# Patient Record
Sex: MALE | Race: WHITE | URBAN - METROPOLITAN AREA
[De-identification: names, ages, dates, MRNs, and addresses within clinical notes are randomized per-mention and may not be internally consistent; named-entity substitution may affect disease eponyms.]

---

## 2017-01-18 ENCOUNTER — OFFICE VISIT (OUTPATIENT)
Dept: FAMILY MEDICINE | Facility: CLINIC | Age: 62
End: 2017-01-18

## 2017-01-18 VITALS
BODY MASS INDEX: 31.15 KG/M2 | DIASTOLIC BLOOD PRESSURE: 80 MMHG | HEART RATE: 99 BPM | HEIGHT: 72 IN | WEIGHT: 230 LBS | OXYGEN SATURATION: 99 % | SYSTOLIC BLOOD PRESSURE: 124 MMHG | TEMPERATURE: 98.1 F

## 2017-01-18 DIAGNOSIS — Z71.3 WEIGHT LOSS COUNSELING, ENCOUNTER FOR: Primary | ICD-10-CM

## 2017-01-18 PROCEDURE — 99213 OFFICE O/P EST LOW 20 MIN: CPT | Performed by: FAMILY MEDICINE

## 2017-01-18 NOTE — NURSING NOTE
Araceli Vazquez is here discuss starting the medication, Contrave, for weight loss.    Pre-Visit Screening :  Immunizations : up to date  Colon Screening : is up to date  Asthma Action Test/Plan : NA  PHQ9/GAD7 :  Completed Today  BP done on the right arm, with a large sized cuff.  Pulse - regular  My Chart - declines    CLASSIFICATION OF OVERWEIGHT AND OBESITY BY BMI                         Obesity Class           BMI(kg/m2)  Underweight                                    < 18.5  Normal                                         18.5-24.9  Overweight                                     25.0-29.9  OBESITY                     I                  30.0-34.9                              II                 35.0-39.9  EXTREME OBESITY             III                >40                             Patient's  BMI Body mass index is 31.19 kg/(m^2).  http://hin.nhlbi.nih.gov/menuplanner/menu.cgi  Questioned patient about current smoking habits.  Pt. quit smoking some time ago.  Christel Hammond, CMA

## 2017-01-18 NOTE — Clinical Note
Can you tell me what you know about weight loss supplement Contrare-if you want you can tell me in am  thx

## 2017-01-18 NOTE — PROGRESS NOTES
SUBJECTIVE: Araceli Vazquez is a 61 year old male who presents for weight loss discussion.  He has lost weight through [facility] but now gaining back- wants to try medication he saw advertised Contrare.    Patient Active Problem List   Diagnosis     ACP (advance care planning)     Health Care Home     Ulcerative colitis (H)     Obesity     Cervicalgia     Chronic midline low back pain without sciatica     Past Medical History   Diagnosis Date     Ulcerative colitis, unspecified 10/8/2012     Family History   Problem Relation Age of Onset     Asthma No family hx of      C.A.D. 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[employer]     Social History Main Topics     Smoking status: Former Smoker     Smokeless tobacco: Never Used     Alcohol Use: No     Drug Use: No     Sexual Activity:     Partners: Female     Other Topics Concern     Exercise No     Seat Belt Yes     Self-Exams No     Social History Narrative     Past Surgical History   Procedure Laterality Date     No history of surgery         Current Outpatient Prescriptions on File Prior to Visit:  traMADol (ULTRAM) 50 MG tablet TAKE 1 TABLET BY MOUTH EVERY 8 HOURS AS NEEDED   cyclobenzaprine (FLEXERIL) 10 MG tablet Take 1 tablet (10 mg) by mouth nightly as needed     No current facility-administered medications on file prior to visit.     Allergies: Review of patient's allergies indicates no known allergies.    Immunization History   Administered Date(s) Administered     Influenza (IIV3) 10/13/2012, 10/20/2013     Influenza Vaccine IM 3yrs+ 4 Valent  IIV4 10/13/2016     Tdap (Adacel,Boostrix) 08/14/2008        OBJECTIVE:   /80 mmHg  Pulse 99  Temp(Src) 98.1  F (36.7  C) (Oral)  Ht 1.829 m (6')  Wt 104.327 kg (230 lb)  BMI 31.19 kg/m2  SpO2 99%     ASSESSMENT: /PLAN:   (Z71.3) Weight loss counseling, encounter for  (primary encounter diagnosis)  Comment: we discussed my feeling on weight loss medications as short term solution-still need to find long term way to maintain weight loss- I also am unfamiliar with this medication which appears to include naltrexone and bupropion.    Plan: we agree I will discuss with MTM- if I am not comfotable prescribing I will refer to weight loss doc, will call pt tomorrow    Encourage low carb, low sugar and dairy, higher fat and protein diet to help with satiety and normalize insulin levels and BG

## 2017-01-19 NOTE — PROGRESS NOTES
D/w MTM-will OK trial Contrave-reviewed side effects with pt, he has never had a seizure, I reviewed the risks, benefits, and possible side effects of the medication.  The patient had an opportunity to ask any questions regarding the treatment plan. The patient was encouraged to call my office if any problems.     Recheck 12 weeks

## 2017-02-27 DIAGNOSIS — M54.50 CHRONIC MIDLINE LOW BACK PAIN WITHOUT SCIATICA: ICD-10-CM

## 2017-02-27 DIAGNOSIS — G89.29 CHRONIC MIDLINE LOW BACK PAIN WITHOUT SCIATICA: ICD-10-CM

## 2017-02-27 NOTE — TELEPHONE ENCOUNTER
Was last given a refill from [facility] on 7/12/16.     Chronic midline low back pain without sciatica (primary encounter diagnosis)  Comment: normal exam today, we discussed that meds are band aid and that better plan is ongoing stretching and strengthening  Plan: traMADol (ULTRAM) 50 MG tablet, cyclobenzaprine  (FLEXERIL) 10 MG tablet  OK for refill of #90 pills, no more than every 6 mo, handout with core exercises provided, would like him to do there daily    Please advise  Araceli Vazquez is requesting a refill of:    Pending Prescriptions:                       Disp   Refills    traMADol (ULTRAM) 50 MG tablet [Pharmacy *10 tab*0            Sig: TAKE 1 TABLET BY MOUTH EVERY 8 HOURS AS NEEDED    cyclobenzaprine (FLEXERIL) 10 MG tablet [*10 tab*0            Sig: TAKE 1 TABLET(10 MG) BY MOUTH EVERY EVENING AS           NEEDED

## 2017-02-28 NOTE — TELEPHONE ENCOUNTER
Pt will wait for your decision.    Araceli Roca Marcelloorlando is requesting a refill of:    Pending Prescriptions:                       Disp   Refills    traMADol (ULTRAM) 50 MG tablet [Pharmacy *90 tab*0            Sig: TAKE 1 TABLET BY MOUTH EVERY 8 HOURS AS NEEDED    cyclobenzaprine (FLEXERIL) 10 MG tablet [*90 tab*0            Sig: TAKE 1 TABLET(10 MG) BY MOUTH EVERY EVENING AS           NEEDED

## 2017-02-28 NOTE — TELEPHONE ENCOUNTER
Call patient and notify him that DR Walker is out of town. These are prn medications and he will need to wait for a decision next week. It appears in 1/2017 he was started on a new medication and is due for a recheck mid March. Encourage him to schedule his appointment

## 2017-02-28 NOTE — TELEPHONE ENCOUNTER
Forwarded to [facility]- for chronic pain management and refills

## 2017-03-06 RX ORDER — TRAMADOL HYDROCHLORIDE 50 MG/1
TABLET ORAL
Qty: 90 TABLET | Refills: 0 | Status: SHIPPED | OUTPATIENT
Start: 2017-03-06 | End: 2017-10-04

## 2017-03-06 RX ORDER — CYCLOBENZAPRINE HCL 10 MG
TABLET ORAL
Qty: 90 TABLET | Refills: 0 | Status: SHIPPED | OUTPATIENT
Start: 2017-03-06 | End: 2018-01-16

## 2017-03-07 NOTE — TELEPHONE ENCOUNTER
Pt went to  his medication and there was no refill for the cyclobenzaprine.   Called the [facility] and gave a verbal for the medication.   SUSANNE Lal ([facility])

## 2017-03-07 NOTE — TELEPHONE ENCOUNTER
Called patient and informed him that his med request was approved and both prescriptions have been sent to his pharmacy.    Christel Hammond, CMA

## 2017-10-04 DIAGNOSIS — M54.50 CHRONIC MIDLINE LOW BACK PAIN WITHOUT SCIATICA: ICD-10-CM

## 2017-10-04 DIAGNOSIS — G89.29 CHRONIC MIDLINE LOW BACK PAIN WITHOUT SCIATICA: ICD-10-CM

## 2017-10-04 RX ORDER — TRAMADOL HYDROCHLORIDE 50 MG/1
TABLET ORAL
Qty: 30 TABLET | Refills: 0 | OUTPATIENT
Start: 2017-10-04

## 2017-10-04 RX ORDER — TRAMADOL HYDROCHLORIDE 50 MG/1
TABLET ORAL
Qty: 30 TABLET | Refills: 0 | Status: SHIPPED | OUTPATIENT
Start: 2017-10-04 | End: 2018-01-16

## 2017-10-04 RX ORDER — CYCLOBENZAPRINE HCL 10 MG
TABLET ORAL
Qty: 30 TABLET | Refills: 0 | Status: SHIPPED | OUTPATIENT
Start: 2017-10-04 | End: 2018-01-16

## 2017-10-04 NOTE — TELEPHONE ENCOUNTER
Pending Prescriptions:                       Disp   Refills    cyclobenzaprine (FLEXERIL) 10 MG tablet [*30 tab*             Sig: TAKE 1 TABLET(10 MG) BY MOUTH EVERY EVENING AS           NEEDED    traMADol (ULTRAM) 50 MG tablet [Pharmacy *30 tab*             Sig: TAKE 1 TABLET BY MOUTH EVERY 8 HOURS AS NEEDED    traMADol (ULTRAM) 50 MG tablet [Pharmacy *30 tab*             Sig: TAKE 1 TABLET BY MOUTH EVERY 8 HOURS AS NEEDED    PLEASE REVIEW JC:      Pt last refill was 2-27/3-7  Pt last ov was 1-  TWO meds that are the same, deny one if not needed.  Please fax or deny or send to !  [phone] (home)

## 2018-01-04 DIAGNOSIS — M54.50 CHRONIC MIDLINE LOW BACK PAIN WITHOUT SCIATICA: ICD-10-CM

## 2018-01-04 DIAGNOSIS — G89.29 CHRONIC MIDLINE LOW BACK PAIN WITHOUT SCIATICA: ICD-10-CM

## 2018-01-09 RX ORDER — TRAMADOL HYDROCHLORIDE 50 MG/1
TABLET ORAL
Qty: 30 TABLET | Refills: 0 | OUTPATIENT
Start: 2018-01-09

## 2018-01-09 RX ORDER — CYCLOBENZAPRINE HCL 10 MG
TABLET ORAL
Qty: 30 TABLET | Refills: 0 | OUTPATIENT
Start: 2018-01-09

## 2018-01-09 NOTE — TELEPHONE ENCOUNTER
We received refill requests from [facility] for the following    Pending Prescriptions:                       Disp   Refills    cyclobenzaprine (FLEXERIL) 10 MG tablet [*30 tab*0            Sig: TAKE 1 TABLET(10 MG) BY MOUTH EVERY EVENING AS           NEEDED    traMADol (ULTRAM) 50 MG tablet [Pharmacy *30 tab*0            Sig: TAKE 1 TABLET BY MOUTH EVERY 8 HOURS AS NEEDED    Pt was last in the office 1-  Pt is OVERDUE for office visit.     Please confirm or deny.

## 2018-01-16 ENCOUNTER — OFFICE VISIT (OUTPATIENT)
Dept: FAMILY MEDICINE | Facility: CLINIC | Age: 63
End: 2018-01-16

## 2018-01-16 VITALS
TEMPERATURE: 98.3 F | DIASTOLIC BLOOD PRESSURE: 84 MMHG | SYSTOLIC BLOOD PRESSURE: 136 MMHG | BODY MASS INDEX: 32.88 KG/M2 | HEART RATE: 74 BPM | OXYGEN SATURATION: 98 % | WEIGHT: 242.4 LBS

## 2018-01-16 DIAGNOSIS — M54.2 CERVICALGIA: ICD-10-CM

## 2018-01-16 DIAGNOSIS — M54.50 CHRONIC MIDLINE LOW BACK PAIN WITHOUT SCIATICA: Primary | ICD-10-CM

## 2018-01-16 DIAGNOSIS — Z23 NEED FOR VACCINATION: ICD-10-CM

## 2018-01-16 DIAGNOSIS — G89.29 CHRONIC MIDLINE LOW BACK PAIN WITHOUT SCIATICA: Primary | ICD-10-CM

## 2018-01-16 PROCEDURE — 99213 OFFICE O/P EST LOW 20 MIN: CPT | Mod: 25 | Performed by: FAMILY MEDICINE

## 2018-01-16 PROCEDURE — 90471 IMMUNIZATION ADMIN: CPT | Performed by: FAMILY MEDICINE

## 2018-01-16 PROCEDURE — 90715 TDAP VACCINE 7 YRS/> IM: CPT | Performed by: FAMILY MEDICINE

## 2018-01-16 RX ORDER — CYCLOBENZAPRINE HCL 10 MG
10 TABLET ORAL 3 TIMES DAILY PRN
Qty: 90 TABLET | Refills: 0 | Status: SHIPPED | OUTPATIENT
Start: 2018-01-16 | End: 2018-11-07

## 2018-01-16 RX ORDER — TRAMADOL HYDROCHLORIDE 50 MG/1
50 TABLET ORAL EVERY 8 HOURS PRN
Qty: 90 TABLET | Refills: 0 | Status: SHIPPED | OUTPATIENT
Start: 2018-01-16 | End: 2018-11-14

## 2018-01-16 NOTE — NURSING NOTE
Abelino is here for med check for pain meds for his back-recently fell \down in driveway and hit his head as well    Pre-visit Screening:  Immunizations:  up to date Tdap today  Colonoscopy:  is up to date  Mammogram: NA  Asthma Action Test/Plan:  NA  PHQ9:  NA  GAD7:  Ting  Questioned patient about current smoking habits Pt. has never smoked.  Ok to leave detailed message on voice mail for today's visit only Yes, phone # [phone]

## 2018-01-16 NOTE — PROGRESS NOTES
SUBJECTIVE:                                                    Araceli Vazquez is a 62 year old male who presents to clinic today for the following health issues:      Back Pain Follow Up      Description:   Location of pain:  center  Character of pain: dull ache  Pain radiation: Does not radiate  Since last visit, pain is:  unchanged  New numbness or weakness in legs, not attributed to pain:  no     Intensity: mild, moderate    History:   Pain interferes with job: Not applicable, retired  Therapies tried without relief: acetaminophen (Tylenol), NSAIDs   Therapies tried with relief: meds, PT           Accompanying Signs & Symptoms:  Risk of Fracture:  None  Risk of Cauda Equina:  None  Risk of Infection:  None  Risk of Cancer:  None          Amount of exercise or physical activity: 2-3 days/week for an average of 30-45 minutes    Problems taking medications regularly: No    Medication side effects: none    Diet: regular (no restrictions)            Problem list and histories reviewed & adjusted, as indicated.  Additional history: as documented    Patient Active Problem List   Diagnosis     ACP (advance care planning)     Health Care Home     Ulcerative colitis (H)     Obesity     Cervicalgia     Chronic midline low back pain without sciatica     Past Surgical History:   Procedure Laterality Date     NO HISTORY OF SURGERY         Social History   Substance Use Topics     Smoking status: Former Smoker     Smokeless tobacco: Never Used     Alcohol use No     Family History   Problem Relation Age of Onset     Asthma No family hx of      C.A.D. No family hx of      DIABETES No family hx of      Hypertension No family hx of      Cancer - colorectal No family hx of      Prostate Cancer No family hx of      Alzheimer Disease Mother      CANCER No family hx of      Lipids No family hx of          Current Outpatient Prescriptions   Medication Sig Dispense Refill     cyclobenzaprine (FLEXERIL) 10 MG tablet Take 1  tablet (10 mg) by mouth 3 times daily as needed for muscle spasms 90 tablet 0     traMADol (ULTRAM) 50 MG tablet Take 1 tablet (50 mg) by mouth every 8 hours as needed for moderate pain 90 tablet 0     No Known Allergies      ROS:  Constitutional, HEENT, cardiovascular, pulmonary, gi and gu systems are negative, except as otherwise noted.      OBJECTIVE:   /84 (BP Location: Left arm, Patient Position: Chair, Cuff Size: Adult Large)  Pulse 74  Temp 98.3  F (36.8  C) (Oral)  Wt 110 kg (242 lb 6.4 oz)  SpO2 98%  BMI 32.88 kg/m2  Body mass index is 32.88 kg/(m^2).   GENERAL: healthy, alert and no distress  NECK: no adenopathy, no asymmetry, masses, or scars and thyroid normal to palpation  RESP: lungs clear to auscultation - no rales, rhonchi or wheezes  CV: regular rate and rhythm, normal S1 S2, no S3 or S4, no murmur, click or rub, no peripheral edema and peripheral pulses strong  ABDOMEN: soft, nontender, no hepatosplenomegaly, no masses and bowel sounds normal  MS: no gross musculoskeletal defects noted, no edema    Diagnostic Test Results:  none     ASSESSMENT:       PLAN:   (M54.5,  G89.29) Chronic midline low back pain without sciatica  (primary encounter diagnosis)  Comment: stable-discussed my concerns about ongoing med use-he agrees to continue 90 pills per 6 mo only, if any increased need will see spine and/or pain  Plan: cyclobenzaprine (FLEXERIL) 10 MG tablet,         traMADol (ULTRAM) 50 MG tablet            (M54.2) Cervicalgia  Comment: as above  Plan:     (Z23) Need for vaccination  Comment:   Plan: VACCINE ADMINISTRATION, INITIAL, TDAP VACCINE         (BOOSTRIX)              BMI:   Estimated body mass index is 32.88 kg/(m^2) as calculated from the following:    Height as of 1/18/17: 1.829 m (6').    Weight as of this encounter: 110 kg (242 lb 6.4 oz).   Weight management plan: Discussed healthy diet and exercise guidelines and patient will follow up in 12 months in clinic to  re-evaluate.        FUTURE APPOINTMENTS:       - Follow-up visit in 6-12 mo  Work on weight loss  Regular exercise    Sergio Walker MD  [facility]

## 2018-01-16 NOTE — MR AVS SNAPSHOT
"              After Visit Summary   2018    Araceli Vazquez    MRN: [id number]           Patient Information     Date Of Birth          1955        Visit Information        Provider Department      2018 12:45 PM Sergio Walker MD [facility]        Today's Diagnoses     Chronic midline low back pain without sciatica    -  1    Cervicalgia        Need for vaccination           Follow-ups after your visit        Who to contact     If you have questions or need follow up information about today's clinic visit or your schedule please contact [facility] directly at [phone].  Normal or non-critical lab and imaging results will be communicated to you by CellScapehart, letter or phone within 4 business days after the clinic has received the results. If you do not hear from us within 7 days, please contact the clinic through CellScapehart or phone. If you have a critical or abnormal lab result, we will notify you by phone as soon as possible.  Submit refill requests through Intcomex or call your pharmacy and they will forward the refill request to us. Please allow 3 business days for your refill to be completed.          Additional Information About Your Visit        MyChart Information     Intcomex lets you send messages to your doctor, view your test results, renew your prescriptions, schedule appointments and more. To sign up, go to [email or web address] . Click on \"Log in\" on the left side of the screen, which will take you to the Welcome page. Then click on \"Sign up Now\" on the right side of the page.     You will be asked to enter the access code listed below, as well as some personal information. Please follow the directions to create your username and password.     Your access code is: [email or web address]  Expires: 2018  1:22 PM     Your access code will  in 90 days. If you need help or a new code, please call your [facility] clinic or " [phone].        Care EveryWhere ID     This is your Care EveryWhere ID. This could be used by other organizations to access your [facility] medical records  [id number]        Your Vitals Were     Pulse Temperature Pulse Oximetry BMI (Body Mass Index)          74 98.3  F (36.8  C) (Oral) 98% 32.88 kg/m2         Blood Pressure from Last 3 Encounters:   01/16/18 136/84   01/18/17 124/80   12/27/16 132/68    Weight from Last 3 Encounters:   01/16/18 110 kg (242 lb 6.4 oz)   01/18/17 104.3 kg (230 lb)   12/27/16 103.1 kg (227 lb 6.4 oz)              We Performed the Following     TDAP VACCINE (BOOSTRIX)     VACCINE ADMINISTRATION, INITIAL          Today's Medication Changes          These changes are accurate as of: 1/16/18 11:59 PM.  If you have any questions, ask your nurse or doctor.               These medicines have changed or have updated prescriptions.        Dose/Directions    cyclobenzaprine 10 MG tablet   Commonly known as:  FLEXERIL   This may have changed:  See the new instructions.   Used for:  Chronic midline low back pain without sciatica   Changed by:  Sergio Walker MD        Dose:  10 mg   Take 1 tablet (10 mg) by mouth 3 times daily as needed for muscle spasms   Quantity:  90 tablet   Refills:  0       traMADol 50 MG tablet   Commonly known as:  ULTRAM   This may have changed:  See the new instructions.   Used for:  Chronic midline low back pain without sciatica   Changed by:  Sergio Walker MD        Dose:  50 mg   Take 1 tablet (50 mg) by mouth every 8 hours as needed for moderate pain   Quantity:  90 tablet   Refills:  0            Where to get your medicines      These medications were sent to [facility] [id number] [place] [address] - [address] AT [facility] [address]  [address], [facility] [address]     Phone:  [phone]     cyclobenzaprine 10 MG tablet         Some of these will need a paper prescription and others can be bought  over the counter.  Ask your nurse if you have questions.     Bring a paper prescription for each of these medications     traMADol 50 MG tablet                Primary Care Provider Office Phone # Fax #    Sergio Walker [phone] [address] [facility] [address]  [facility] [address]        Equal Access to Services     [facility] HospitalBRUNILDA : Hadii aad ku hadasho Soomaali, waaxda luqadaha, qaybta kaalmada adeegyada, waxay idiin hayaan adeeg khcareysh la'lucrecian . So [facility] [phone].    ATENCIÓN: Si habla español, tiene a pearson disposición servicios gratuitos de asistencia lingüística. [facility] [phone].    We comply with applicable federal civil rights laws and [place] laws. We do not discriminate on the basis of race, color, national origin, age, disability, sex, sexual orientation, or gender identity.            Thank you!     Thank you for choosing [facility]  for your care. Our goal is always to provide you with excellent care. Hearing back from our patients is one way we can continue to improve our services. Please take a few minutes to complete the written survey that you may receive in the mail after your visit with us. Thank you!             Your Updated Medication List - Protect others around you: Learn how to safely use, store and throw away your medicines at www.disposemymeds.org.          This list is accurate as of: 1/16/18 11:59 PM.  Always use your most recent med list.                   Brand Name Dispense Instructions for use Diagnosis    cyclobenzaprine 10 MG tablet    FLEXERIL    90 tablet    Take 1 tablet (10 mg) by mouth 3 times daily as needed for muscle spasms    Chronic midline low back pain without sciatica       traMADol 50 MG tablet    ULTRAM    90 tablet    Take 1 tablet (50 mg) by mouth every 8 hours as needed for moderate pain    Chronic midline low back pain without sciatica

## 2018-09-12 ENCOUNTER — OFFICE VISIT (OUTPATIENT)
Dept: FAMILY MEDICINE | Facility: CLINIC | Age: 63
End: 2018-09-12

## 2018-09-12 VITALS
OXYGEN SATURATION: 96 % | WEIGHT: 239 LBS | SYSTOLIC BLOOD PRESSURE: 120 MMHG | DIASTOLIC BLOOD PRESSURE: 82 MMHG | TEMPERATURE: 98 F | HEART RATE: 90 BPM | BODY MASS INDEX: 32.41 KG/M2

## 2018-09-12 DIAGNOSIS — R05.9 COUGH: Primary | ICD-10-CM

## 2018-09-12 LAB
% GRANULOCYTES: 66.7 %
HCT VFR BLD AUTO: 50.4 % (ref 40–53)
HEMOGLOBIN: 16.3 G/DL (ref 13.3–17.7)
LYMPHOCYTES NFR BLD AUTO: 18.1 %
MCH RBC QN AUTO: 28.6 PG (ref 26–33)
MCHC RBC AUTO-ENTMCNC: 32.3 G/DL (ref 31–36)
MCV RBC AUTO: 88.5 FL (ref 78–100)
MONOCYTES NFR BLD AUTO: 15.2 %
PLATELET COUNT - QUEST: 307 10^9/L (ref 150–375)
RBC # BLD AUTO: 5.69 10*12/L (ref 4.4–5.9)
WBC # BLD AUTO: 15.2 10*9/L (ref 4–11)

## 2018-09-12 PROCEDURE — 85025 COMPLETE CBC W/AUTO DIFF WBC: CPT | Performed by: FAMILY MEDICINE

## 2018-09-12 PROCEDURE — 99213 OFFICE O/P EST LOW 20 MIN: CPT | Performed by: FAMILY MEDICINE

## 2018-09-12 PROCEDURE — 36415 COLL VENOUS BLD VENIPUNCTURE: CPT | Performed by: FAMILY MEDICINE

## 2018-09-12 ASSESSMENT — ANXIETY QUESTIONNAIRES
IF YOU CHECKED OFF ANY PROBLEMS ON THIS QUESTIONNAIRE, HOW DIFFICULT HAVE THESE PROBLEMS MADE IT FOR YOU TO DO YOUR WORK, TAKE CARE OF THINGS AT HOME, OR GET ALONG WITH OTHER PEOPLE: NOT DIFFICULT AT ALL
2. NOT BEING ABLE TO STOP OR CONTROL WORRYING: NOT AT ALL
6. BECOMING EASILY ANNOYED OR IRRITABLE: NOT AT ALL
GAD7 TOTAL SCORE: 0
1. FEELING NERVOUS, ANXIOUS, OR ON EDGE: NOT AT ALL
7. FEELING AFRAID AS IF SOMETHING AWFUL MIGHT HAPPEN: NOT AT ALL
3. WORRYING TOO MUCH ABOUT DIFFERENT THINGS: NOT AT ALL
5. BEING SO RESTLESS THAT IT IS HARD TO SIT STILL: NOT AT ALL

## 2018-09-12 ASSESSMENT — PATIENT HEALTH QUESTIONNAIRE - PHQ9: 5. POOR APPETITE OR OVEREATING: NOT AT ALL

## 2018-09-12 NOTE — PROGRESS NOTES
SUBJECTIVE:   Araceli Vazquez is a 63 year old male who complains of swollen glands, earache, dry cough, chest congestion and sweats and chills for 10 days. Pt had sinus headaches initially 2-3 weeks ago and was prescribed Zpack by friend-took and felt better-[place] and symptoms recurred. He was prescribed a second Zpack which he just finished today. NO better and now the symptoms noted above    He denies a history of shortness of breath and chest pain and denies a history of asthma. Patient does not smoke cigarettes.    He has tried Mucinex without much relief, ibuprofen, advil cold and sinus    Patient Active Problem List   Diagnosis     ACP (advance care planning)     Health Care Home     Ulcerative colitis (H)     Obesity     Cervicalgia     Chronic midline low back pain without sciatica     Past Medical History:   Diagnosis Date     Ulcerative colitis, unspecified 10/8/2012     Family History   Problem Relation Age of Onset     Asthma No family hx of      C.A.D. 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[employer]     Social History Main Topics     Smoking status: Former Smoker     Smokeless tobacco: Never Used     Alcohol use No     Drug use: No     Sexual activity: Yes     Partners: Female     Other Topics Concern     Exercise No     Seat Belt Yes     Self-Exams No     Social History Narrative     Past Surgical History:   Procedure Laterality Date     NO HISTORY OF SURGERY         Current Outpatient Prescriptions on File Prior to Visit:  cyclobenzaprine (FLEXERIL)  10 MG tablet Take 1 tablet (10 mg) by mouth 3 times daily as needed for muscle spasms   traMADol (ULTRAM) 50 MG tablet Take 1 tablet (50 mg) by mouth every 8 hours as needed for moderate pain     No current facility-administered medications on file prior to visit.      Allergies: Review of patient's allergies indicates no known allergies.    Immunization History   Administered Date(s) Administered     Influenza (IIV3) PF 10/13/2012, 10/20/2013     Influenza Vaccine IM 3yrs+ 4 Valent IIV4 10/13/2016, 10/26/2017     TDAP Vaccine (Boostrix) 01/16/2018     Tdap (Adacel,Boostrix) 08/14/2008         OBJECTIVE:/82 (BP Location: Right arm, Patient Position: Sitting, Cuff Size: Adult Large)  Pulse 90  Temp 98  F (36.7  C) (Oral)  Wt 108.4 kg (239 lb)  SpO2 96%  BMI 32.41 kg/m2   He appears well, vital signs are as noted by the nurse. Ears normal.  Throat and pharynx normal.  Neck supple. No adenopathy in the neck. Nose is congested. Sinuses non tender. The chest is clear, without wheezes or rales.    ASSESSMENT:   Viral syndrome-given failure to respond to 2 rounds of abx (Zpacks) and no left shift on CBC suspect viral illness-also considered allergy diagnosis as symptoms seem worse at cabin     PLAN:  Symptomatic therapy suggested: push fluids, rest and use allergy meds as needed. Call or return to clinic prn if these symptoms worsen or fail to improve as anticipated.

## 2018-09-12 NOTE — MR AVS SNAPSHOT
"              After Visit Summary   2018    Araceli Vazquez    MRN: [id number]           Patient Information     Date Of Birth          1955        Visit Information        Provider Department      2018 3:00 PM Sergio Walker MD [facility]        Today's Diagnoses     Cough    -  1       Follow-ups after your visit        Who to contact     If you have questions or need follow up information about today's clinic visit or your schedule please contact [facility] directly at [phone].  Normal or non-critical lab and imaging results will be communicated to you by [employer], letter or phone within 4 business days after the clinic has received the results. If you do not hear from us within 7 days, please contact the clinic through Eyen[employer] or phone. If you have a critical or abnormal lab result, we will notify you by phone as soon as possible.  Submit refill requests through [employer] or call your pharmacy and they will forward the refill request to us. Please allow 3 business days for your refill to be completed.          Additional Information About Your Visit        MyChart Information     [employer] lets you send messages to your doctor, view your test results, renew your prescriptions, schedule appointments and more. To sign up, go to [email or web address] . Click on \"Log in\" on the left side of the screen, which will take you to the Welcome page. Then click on \"Sign up Now\" on the right side of the page.     You will be asked to enter the access code listed below, as well as some personal information. Please follow the directions to create your username and password.     Your access code is: [email or web address]  Expires: 2018  4:16 PM     Your access code will  in 90 days. If you need help or a new code, please call your [place] clinic or [phone].        Care EveryWhere ID     This is your Care EveryWhere ID. This could be used " by other organizations to access your [place] medical records  [id number]        Your Vitals Were     Pulse Temperature Pulse Oximetry BMI (Body Mass Index)          90 98  F (36.7  C) (Oral) 96% 32.41 kg/m2         Blood Pressure from Last 3 Encounters:   09/12/18 120/82   01/16/18 136/84   01/18/17 124/80    Weight from Last 3 Encounters:   09/12/18 108.4 kg (239 lb)   01/16/18 110 kg (242 lb 6.4 oz)   01/18/17 104.3 kg (230 lb)              We Performed the Following     CL AFF HEMOGRAM/PLATE/DIFF ([facility])        Primary Care Provider Office Phone # Fax #    Sergio Walker [phone] [address] [address]        Equal Access to Services     [facility] JOSE E : Hadii aad ku hadasho Soomaali, waaxda luqadaha, qaybta kaalmada adeegyada, tobias iverson haylucrecian norberto briceno . So [facility] [phone].    ATENCIÓN: Si habla español, tiene a pearson disposición servicios gratuitos de asistencia lingüística. Yao al [phone].    We comply with applicable federal civil rights laws and [place] laws. We do not discriminate on the basis of race, color, national origin, age, disability, sex, sexual orientation, or gender identity.            Thank you!     Thank you for choosing [facility]  for your care. Our goal is always to provide you with excellent care. Hearing back from our patients is one way we can continue to improve our services. Please take a few minutes to complete the written survey that you may receive in the mail after your visit with us. Thank you!             Your Updated Medication List - Protect others around you: Learn how to safely use, store and throw away your medicines at www.disposemymeds.org.          This list is accurate as of 9/12/18  4:16 PM.  Always use your most recent med list.                   Brand Name Dispense Instructions for use Diagnosis    cyclobenzaprine 10 MG tablet    FLEXERIL    90 tablet    Take 1  tablet (10 mg) by mouth 3 times daily as needed for muscle spasms    Chronic midline low back pain without sciatica       traMADol 50 MG tablet    ULTRAM    90 tablet    Take 1 tablet (50 mg) by mouth every 8 hours as needed for moderate pain    Chronic midline low back pain without sciatica

## 2018-09-12 NOTE — NURSING NOTE
Patient is here due to having headaches and a severe cough. He is coughing up mucus and things from his lungs and it becomes worse at night. He now is starting to have swollen glands along with pain in ear area along with night sweats. He has already tried 2 rounds of azithromycin and took the last pill today with no relief.     Pre-visit Screening:  Immunizations:  up to date  Colonoscopy:  is up to date  Mammogram: NA  Asthma Action Test/Plan:  No concerns  PHQ9: PHQ-9 given today   GAD7:  RAFFI-7 given today   Questioned patient about current smoking habits Pt. quit smoking some time ago.  Ok to leave detailed message on voice mail for today's visit only yes, phone # [phone]

## 2018-09-13 ASSESSMENT — PATIENT HEALTH QUESTIONNAIRE - PHQ9: SUM OF ALL RESPONSES TO PHQ QUESTIONS 1-9: 4

## 2018-09-13 ASSESSMENT — ANXIETY QUESTIONNAIRES: GAD7 TOTAL SCORE: 0

## 2018-11-07 DIAGNOSIS — G89.29 CHRONIC MIDLINE LOW BACK PAIN WITHOUT SCIATICA: ICD-10-CM

## 2018-11-07 DIAGNOSIS — M54.50 CHRONIC MIDLINE LOW BACK PAIN WITHOUT SCIATICA: ICD-10-CM

## 2018-11-07 RX ORDER — CYCLOBENZAPRINE HCL 10 MG
TABLET ORAL
Qty: 90 TABLET | Refills: 0 | Status: SHIPPED | OUTPATIENT
Start: 2018-11-07 | End: 2018-11-14

## 2018-11-07 NOTE — TELEPHONE ENCOUNTER
Pending Prescriptions:                       Disp   Refills    cyclobenzaprine (FLEXERIL) 10 MG tablet [*90 tab*             Sig: TAKE 1 TABLET(10 MG) BY MOUTH THREE TIMES DAILY           AS NEEDED FOR MUSCLE SPASMS    Last refill was 1-2018   Fax or mahesh Albarado  [phone] (home)

## 2018-11-14 DIAGNOSIS — G89.29 CHRONIC MIDLINE LOW BACK PAIN WITHOUT SCIATICA: ICD-10-CM

## 2018-11-14 DIAGNOSIS — M54.50 CHRONIC MIDLINE LOW BACK PAIN WITHOUT SCIATICA: ICD-10-CM

## 2018-11-14 RX ORDER — TRAMADOL HYDROCHLORIDE 50 MG/1
TABLET ORAL
Qty: 90 TABLET | Refills: 0 | OUTPATIENT
Start: 2018-11-14

## 2018-11-14 RX ORDER — CYCLOBENZAPRINE HCL 10 MG
TABLET ORAL
Qty: 90 TABLET | Refills: 0 | Status: SHIPPED | OUTPATIENT
Start: 2018-11-14 | End: 2018-11-14

## 2018-11-14 RX ORDER — TRAMADOL HYDROCHLORIDE 50 MG/1
50 TABLET ORAL EVERY 8 HOURS PRN
Qty: 90 TABLET | Refills: 0 | Status: SHIPPED | OUTPATIENT
Start: 2018-11-14

## 2018-11-14 NOTE — TELEPHONE ENCOUNTER
Last filled 1/2018 needs ov please advise    Pending Prescriptions:                       Disp   Refills    traMADol (ULTRAM) 50 MG tablet [Pharmacy *90 tab*0            Sig: TAKE 1 TABLET BY MOUTH EVERY 8 HOURS AS NEEDED           FOR MODERATE PAIN.

## 2019-06-10 NOTE — TELEPHONE ENCOUNTER
Abelino called asking for Tramadol.  He has moved to [place] and has not been seen in over a year.  I told him he would have to go to a provider there in order to get this filled.  He verbalized understanding